# Patient Record
Sex: MALE | Race: ASIAN | Employment: FULL TIME | URBAN - METROPOLITAN AREA
[De-identification: names, ages, dates, MRNs, and addresses within clinical notes are randomized per-mention and may not be internally consistent; named-entity substitution may affect disease eponyms.]

---

## 2024-05-26 ENCOUNTER — APPOINTMENT (OUTPATIENT)
Dept: GENERAL RADIOLOGY | Age: 34
End: 2024-05-26
Attending: NURSE PRACTITIONER

## 2024-05-26 ENCOUNTER — HOSPITAL ENCOUNTER (EMERGENCY)
Age: 34
Discharge: HOME | End: 2024-05-26
Attending: EMERGENCY MEDICINE

## 2024-05-26 VITALS
SYSTOLIC BLOOD PRESSURE: 101 MMHG | WEIGHT: 145 LBS | DIASTOLIC BLOOD PRESSURE: 67 MMHG | HEART RATE: 85 BPM | BODY MASS INDEX: 26.68 KG/M2 | TEMPERATURE: 99 F | RESPIRATION RATE: 16 BRPM | HEIGHT: 62 IN | OXYGEN SATURATION: 96 %

## 2024-05-26 DIAGNOSIS — S61.215A LACERATION OF LEFT RING FINGER WITHOUT FOREIGN BODY WITHOUT DAMAGE TO NAIL, INITIAL ENCOUNTER: Primary | ICD-10-CM

## 2024-05-26 PROCEDURE — 12001 RPR S/N/AX/GEN/TRNK 2.5CM/<: CPT

## 2024-05-26 PROCEDURE — 99283 EMERGENCY DEPT VISIT LOW MDM: CPT

## 2024-05-26 PROCEDURE — 73140 X-RAY EXAM OF FINGER(S): CPT | Performed by: NURSE PRACTITIONER

## 2024-05-26 PROCEDURE — 99284 EMERGENCY DEPT VISIT MOD MDM: CPT

## 2024-05-26 RX ORDER — ACETAMINOPHEN 500 MG
1000 TABLET ORAL ONCE
Status: COMPLETED | OUTPATIENT
Start: 2024-05-26 | End: 2024-05-26

## 2024-05-26 NOTE — ED INITIAL ASSESSMENT (HPI)
To er with lac to 4th digit right hand from kitchen knife.  Pt had vaso vagal rxn in the er when he saw the blood

## 2024-05-26 NOTE — ED PROVIDER NOTES
Patient Seen in: [facility] In [place]      History     Chief Complaint   Patient presents with    Laceration     Stated Complaint: left ring finger lac    Subjective:   33-year-old male, who presents to the ER along with his wife.  He presents here for evaluation of a laceration to the tip of the left ring finger.  States he was cutting up and Just prior to arrival, accidentally cutting the tip of his left ring finger.  He appearance with a flap laceration.  States that he is up-to-date with tetanus immunization.  Denies use of blood thinners.  Denies pain.  Denies loss of range of motion.  Denies loss of sensation.            Objective:   Past Medical History:    Hyperlipidemia    Restless leg syndrome              Past Surgical History:   Procedure Laterality Date    Appendectomy      Other accessory      Tonsillectomy                  Social History     Socioeconomic History    Marital status:    Tobacco Use    Smoking status: Never    Smokeless tobacco: Never   Vaping Use    Vaping status: Never Used     Social Determinants of Health     Food Insecurity: Low Risk  (12/1/2023)    Received from [facility]    Food Insecurity     Have there been times that your food ran out, and you didn't have money to get more?: No     Are there times that you worry that this might happen?: No   Transportation Needs: Low Risk  (12/1/2023)    Received from [facility]    Transportation Needs     Do you have trouble getting transportation to medical appointments?: No     How do you normally get to and from your appointments?: Family/Friend              Review of Systems   Constitutional: Negative.    Skin:  Positive for wound.   All other systems reviewed and are negative.      Positive for stated complaint: left ring finger lac  Other systems are as noted in HPI.  Constitutional and vital signs reviewed.      All other systems reviewed and negative except as  noted above.    Physical Exam     ED Triage Vitals [05/26/24 1400]   /67   Pulse 85   Resp 16   Temp 98.5 °F (36.9 °C)   Temp src Temporal   SpO2 96 %   O2 Device None (Room air)       Current Vitals:   Vital Signs  BP: 101/67  Pulse: 85  Resp: 16  Temp: 98.5 °F (36.9 °C)  Temp src: Temporal    Oxygen Therapy  SpO2: 96 %  O2 Device: None (Room air)            Physical Exam  Vitals and nursing note reviewed.   Constitutional:       Appearance: Normal appearance. He is not ill-appearing, toxic-appearing or diaphoretic.   Pulmonary:      Effort: Pulmonary effort is normal. No respiratory distress.   Musculoskeletal:      Comments: Left hand exam is normal except for a flap laceration to the tip of the left ring finger, flap laceration equaling about 1.5 cm.  Minimal bleeding noted, controlled with direct pressure.  No nailbed involvement.  No joint tenderness.  CMS intact.   Skin:     Coloration: Skin is not pale.      Findings: No erythema.   Neurological:      Mental Status: He is alert.               ED Course   Labs Reviewed - No data to display  XR FINGER(S) (MIN 2 VIEWS), LEFT 4TH (CPT=73140)    Result Date: 5/26/2024  PROCEDURE:  XR FINGER(S) (MIN 2 VIEWS), LEFT 4TH (CPT=73140)  INDICATIONS:  left ring finger lac  COMPARISON:  None.  TECHNIQUE:  Three views of the finger were obtained.  PATIENT STATED HISTORY: (As transcribed by Technologist)  Patient complains of distal 4th finger laceration    FINDINGS:  No evidence of acute displaced fracture or dislocation.  Old fracture deformity involving the proximal phalanges of the 3rd and 4th digit is noted.  Mild degenerative changes are noted in the interphalangeal joints.  Soft tissue laceration at the distal aspect of the left 3rd digit is noted.  No foreign bodies are identified.            CONCLUSION:  No acute fracture or dislocation.  Soft tissue laceration in the distal aspect of the left 4th digit.  LOCATION:  [facility]   Dictated by (CST): Naun Sánchez  MD on 5/26/2024 at 3:16 PM     Finalized by (CST): Naun Sánchez MD on 5/26/2024 at 3:17 PM         Verbal consent for the following procedure was obtained.  We discussed the inherent risks of procedure.  We discussed benefits.  Patient agrees to proceed with the procedure and verbalizes understanding of potential complications.  Laceration Repair Procedure Note  I discussed risks and benefits and verbal consent was obtained. Time out performed.  Location: Finger ring finger  left  Length: 2 cm  Cleaning: standard  Foreign Body: no foreign bodies  The wound area was irrigated with sterile saline and draped in a sterile fashion.  The wound area was anesthetized with Lidocaine 1% without epinephrine.  The wound was explored with the following results No foreign bodies found.  The wound was repaired with 5-0 Ethilon; 4 sutures were used.  Suture Technique: Simple  The wound was dressed and antibiotic ointment was applied.  Patient tolerated the procedure without complications.                 MDM                                         Medical Decision Making  33-year-old male, with a left ring finger laceration.  This is to the palmar side of the distal aspect of the left ring finger not involving the nailbed.  No neurovascular deficits.  It is a flap laceration measuring at about 1.5 cm.  I personally viewed, independently interpreted and radiology report was reviewed.  My personal interpretation is no foreign body, no fractures.  Please see procedure note for laceration repair.  Suture/wound care instructions provided.  Patient states that he is up-to-date with his tetanus vaccination.    Supportive/home management of diagnosis/illness/injury discussed. Red flag symptoms discussed.  Signs and symptoms/criteria that would necessitate reevaluation, including ER evaluation discussed.  Patient and/or responsible adult verbalize and agree with management and plan of care.    Speech recognition software was used during  this dictation.  There may be minor errors in transcription.      Amount and/or Complexity of Data Reviewed  Radiology: ordered and independent interpretation performed. Decision-making details documented in ED Course.  ECG/medicine tests: ordered. Decision-making details documented in ED Course.    Risk  OTC drugs.        Disposition and Plan     Clinical Impression:  1. Laceration of left ring finger without foreign body without damage to nail, initial encounter         Disposition:  Discharge  5/26/2024  3:40 pm    Follow-up:  Michael Calvin  [facility][note 3] [facility], [address] [address] [phone]    Call in 2 day(s)  For wound re-check          Medications Prescribed:  Current Discharge Medication List

## 2024-05-26 NOTE — DISCHARGE INSTRUCTIONS
Keep the dressing clean, dry, intact until tomorrow.  Thereafter may gently remove.  May clean the wound area with soap and water and pat to dry.  Use an over-the-counter topical antibiotic ointment like Neosporin or bacitracin to prevent infection.  Suture removal in 14 days, may return to the ER or another healthcare facility with ability to remove sutures.  Return sooner for new or worsening symptoms, especially fevers, redness, puslike drainage, increasing pain.